# Patient Record
Sex: FEMALE | Race: OTHER | NOT HISPANIC OR LATINO | URBAN - METROPOLITAN AREA
[De-identification: names, ages, dates, MRNs, and addresses within clinical notes are randomized per-mention and may not be internally consistent; named-entity substitution may affect disease eponyms.]

---

## 2022-03-03 ENCOUNTER — NEW PATIENT (OUTPATIENT)
Dept: URBAN - METROPOLITAN AREA CLINIC 14 | Facility: CLINIC | Age: 42
End: 2022-03-03

## 2022-03-03 DIAGNOSIS — Z79.899: ICD-10-CM

## 2022-03-03 DIAGNOSIS — H04.123: ICD-10-CM

## 2022-03-03 DIAGNOSIS — I73.00: ICD-10-CM

## 2022-03-03 PROCEDURE — 92134 CPTRZ OPH DX IMG PST SGM RTA: CPT

## 2022-03-03 PROCEDURE — 92004 COMPRE OPH EXAM NEW PT 1/>: CPT

## 2022-03-03 ASSESSMENT — VISUAL ACUITY
OD_SC: 20/20-1
OS_SC: 20/15

## 2022-03-03 ASSESSMENT — TONOMETRY
OD_IOP_MMHG: 14
OS_IOP_MMHG: 14

## 2022-03-03 NOTE — PATIENT DISCUSSION
Reviewed guidelines that recommend returning to Ophthalmology clinic after 5 years of Plaquenil use to begin yearly screening for retinal toxicity with dilated fundus exam, OCT imaging, and visual field test (HVF 10-2 OU).

## 2022-06-21 RX ORDER — DAPSONE 50 MG/G
GEL TOPICAL
COMMUNITY

## 2022-06-21 RX ORDER — HYDROXYCHLOROQUINE SULFATE 200 MG/1
TABLET, FILM COATED ORAL
COMMUNITY

## 2022-06-21 RX ORDER — LORAZEPAM 0.5 MG/1
TABLET ORAL
COMMUNITY
Start: 2022-03-21 | End: 2022-10-25 | Stop reason: SDUPTHER

## 2022-09-06 PROBLEM — F98.8 ADD (ATTENTION DEFICIT DISORDER): Status: ACTIVE | Noted: 2022-09-06

## 2022-09-06 PROBLEM — F41.9 ANXIETY: Status: ACTIVE | Noted: 2022-09-06

## 2022-09-06 PROBLEM — T69.1XXA ERYTHEMA PERNIO: Status: ACTIVE | Noted: 2022-09-06

## 2022-09-06 PROBLEM — I73.00 RAYNAUD'S DISEASE: Status: ACTIVE | Noted: 2022-09-06

## 2022-09-06 PROBLEM — L70.9 ADULT ACNE: Status: ACTIVE | Noted: 2022-09-06
